# Patient Record
Sex: FEMALE | Race: OTHER | Employment: FULL TIME | URBAN - METROPOLITAN AREA
[De-identification: names, ages, dates, MRNs, and addresses within clinical notes are randomized per-mention and may not be internally consistent; named-entity substitution may affect disease eponyms.]

---

## 2024-05-26 ENCOUNTER — HOSPITAL ENCOUNTER (EMERGENCY)
Facility: HOSPITAL | Age: 19
Discharge: HOME | End: 2024-05-26
Attending: EMERGENCY MEDICINE
Payer: COMMERCIAL

## 2024-05-26 VITALS
RESPIRATION RATE: 16 BRPM | HEART RATE: 89 BPM | OXYGEN SATURATION: 99 % | DIASTOLIC BLOOD PRESSURE: 56 MMHG | SYSTOLIC BLOOD PRESSURE: 91 MMHG

## 2024-05-26 DIAGNOSIS — R46.89 COMBATIVE BEHAVIOR: ICD-10-CM

## 2024-05-26 DIAGNOSIS — F10.929 ALCOHOL INTOXICATION (HCC): Primary | ICD-10-CM

## 2024-05-26 DIAGNOSIS — R45.851 SUICIDAL IDEATIONS: ICD-10-CM

## 2024-05-26 LAB
AMPHETAMINES SERPL QL SCN: NEGATIVE
BARBITURATES UR QL: NEGATIVE
BENZODIAZ UR QL: NEGATIVE
COCAINE UR QL: NEGATIVE
EXT PREGNANCY TEST URINE: NEGATIVE
EXT. CONTROL: NORMAL
FENTANYL UR QL SCN: NEGATIVE
HYDROCODONE UR QL SCN: NEGATIVE
METHADONE UR QL: NEGATIVE
OPIATES UR QL SCN: NEGATIVE
OXYCODONE+OXYMORPHONE UR QL SCN: NEGATIVE
PCP UR QL: NEGATIVE
THC UR QL: POSITIVE

## 2024-05-26 PROCEDURE — 81025 URINE PREGNANCY TEST: CPT

## 2024-05-26 PROCEDURE — 80307 DRUG TEST PRSMV CHEM ANLYZR: CPT

## 2024-05-26 PROCEDURE — 96372 THER/PROPH/DIAG INJ SC/IM: CPT

## 2024-05-26 PROCEDURE — 99285 EMERGENCY DEPT VISIT HI MDM: CPT

## 2024-05-26 PROCEDURE — 99285 EMERGENCY DEPT VISIT HI MDM: CPT | Performed by: EMERGENCY MEDICINE

## 2024-05-26 RX ORDER — WATER 10 ML/10ML
INJECTION INTRAMUSCULAR; INTRAVENOUS; SUBCUTANEOUS
Status: COMPLETED
Start: 2024-05-26 | End: 2024-05-26

## 2024-05-26 RX ORDER — MIDAZOLAM HYDROCHLORIDE 2 MG/2ML
5 INJECTION, SOLUTION INTRAMUSCULAR; INTRAVENOUS ONCE
Status: COMPLETED | OUTPATIENT
Start: 2024-05-26 | End: 2024-05-26

## 2024-05-26 RX ORDER — OLANZAPINE 10 MG/2ML
5 INJECTION, POWDER, FOR SOLUTION INTRAMUSCULAR ONCE
Status: COMPLETED | OUTPATIENT
Start: 2024-05-26 | End: 2024-05-26

## 2024-05-26 RX ADMIN — MIDAZOLAM 5 MG: 1 INJECTION INTRAMUSCULAR; INTRAVENOUS at 05:29

## 2024-05-26 RX ADMIN — WATER 10 ML: 1 INJECTION INTRAMUSCULAR; INTRAVENOUS; SUBCUTANEOUS at 05:30

## 2024-05-26 RX ADMIN — OLANZAPINE 5 MG: 10 INJECTION, POWDER, LYOPHILIZED, FOR SOLUTION INTRAMUSCULAR at 05:30
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
## 2024-05-26 NOTE — ED CARE HANDOFF
Emergency Department Sign Out Note        Sign out and transfer of care from Dr Sofia Jhaveri. See Separate Emergency Department note.     The patient, Cassi Hooks, was evaluated by the previous provider for SI, intoxication.    Workup Completed:  Patient observed until clinically sober.  Point-of-care alcohol breath test showed 0.056.  Patient stated that she no longer felt SI, did not have any plans to hurt herself.  Declined hospital admission via 201 for psychiatric treatment.  No grounds to 302.  Recommended psychiatric follow-up, which patient also declined.                                ED Course as of 05/26/24 1448   Sun May 26, 2024   0702 Patient arrested for fighting, drunk, marijuana use, hx of schizophrenia, expressed SI to police. 21F, zyprexa/versed, re-eval once sober. Not in custody.   0909 Patient denying SI at this time     Procedures  Medical Decision Making  Amount and/or Complexity of Data Reviewed  Labs: ordered.    Risk  Prescription drug management.            Disposition  Final diagnoses:   Alcohol intoxication (HCC)   Suicidal ideations   Combative behavior     Time reflects when diagnosis was documented in both MDM as applicable and the Disposition within this note       Time User Action Codes Description Comment    5/26/2024  9:22 AM Willis Lopez Add [F10.929] Alcohol intoxication (HCC)     5/26/2024  9:22 AM Willis Lopez Add [R45.851] Suicidal ideations     5/26/2024  9:22 AM Willis Lopez Add [R46.89] Combative behavior           ED Disposition       ED Disposition   Discharge    Condition   Stable    Date/Time   Sun May 26, 2024  9:22 AM    Comment   Cassi Hooks discharge to home/self care.                   Follow-up Information       Follow up With Specialties Details Why Contact Info Additional Information    [facility] Psychiatry   [address] [facility] [address] [address] [phone] [facility] [address] [facility]  [place], [place] [id number] [phone]          There are no discharge medications for this patient.    No discharge procedures on file.       ED Provider  Electronically Signed by     Willis Lopez MD  05/26/24 4075

## 2024-05-26 NOTE — ED PROVIDER NOTES
"History  Chief Complaint   Patient presents with    Suicidal     Pt was arrested pta for fighting. She began voicing suicidal ideation to police and was brought here.     20 yo F with apparent hx of schizophrenia presents to the ED in police custody for disorderly conduct. Patient states she got drunk and high. When brought to the police station she made numerous suicidal statements about wanting to hang herself, going home and killing herself. On arrival to the ED, patient is belligerent, aggressive and threatening towards staff. She says she's \"been 302'd numerous times and it doesn't help.\" She says she schizophrenia and not on medication for 2-3 years.         None       History reviewed. No pertinent past medical history.    History reviewed. No pertinent surgical history.    History reviewed. No pertinent family history.  I have reviewed and agree with the history as documented.    E-Cigarette/Vaping     E-Cigarette/Vaping Substances     Social History     Tobacco Use    Smoking status: Every Day     Types: Cigarettes        Review of Systems   Unable to perform ROS: Psychiatric disorder   Skin:  Negative for color change and rash.       Physical Exam  ED Triage Vitals [05/26/24 0532]   Temp Pulse Respirations Blood Pressure SpO2   -- (!) 131 (!) 24 142/62 99 %      Temp src Heart Rate Source Patient Position - Orthostatic VS BP Location FiO2 (%)   -- Monitor Sitting Left arm --      Pain Score       --             Orthostatic Vital Signs  Vitals:    05/26/24 0532 05/26/24 0730   BP: 142/62 91/56   Pulse: (!) 131 89   Patient Position - Orthostatic VS: Sitting Lying       Physical Exam  Vitals and nursing note reviewed.   Constitutional:       General: She is not in acute distress.     Appearance: She is well-developed.   HENT:      Head: Normocephalic and atraumatic.   Eyes:      Conjunctiva/sclera: Conjunctivae normal.   Cardiovascular:      Rate and Rhythm: Normal rate and regular rhythm.      Heart sounds: " No murmur heard.  Pulmonary:      Effort: Pulmonary effort is normal. No respiratory distress.      Breath sounds: Normal breath sounds.   Abdominal:      Palpations: Abdomen is soft.      Tenderness: There is no abdominal tenderness.   Musculoskeletal:         General: No swelling.      Cervical back: Neck supple.   Skin:     General: Skin is warm and dry.      Capillary Refill: Capillary refill takes less than 2 seconds.   Neurological:      Mental Status: She is alert.   Psychiatric:         Speech: Speech is rapid and pressured.         Behavior: Behavior is agitated, aggressive and combative.         Judgment: Judgment is impulsive.         ED Medications  Medications   midazolam (VERSED) injection 5 mg (5 mg Intramuscular Given 5/26/24 0529)   OLANZapine (ZyPREXA) IM injection 5 mg (5 mg Intramuscular Given 5/26/24 0530)   sterile water injection **ADS Override Pull** (10 mL  Given 5/26/24 0530)       Diagnostic Studies  Results Reviewed       Procedure Component Value Units Date/Time    Rapid drug screen, urine [[id number]]  (Abnormal) Collected: 05/26/24 0612    Lab Status: Final result Specimen: Urine, Other Updated: 05/26/24 0650     Amph/Meth UR Negative     Barbiturate Ur Negative     Benzodiazepine Urine Negative     Cocaine Urine Negative     Methadone Urine Negative     Opiate Urine Negative     PCP Ur Negative     THC Urine Positive     Oxycodone Urine Negative     Fentanyl Urine Negative     HYDROCODONE URINE Negative    Narrative:      Presumptive report. If requested, specimen will be sent to reference lab for confirmation.  FOR MEDICAL PURPOSES ONLY.   IF CONFIRMATION NEEDED PLEASE CONTACT THE LAB WITHIN 5 DAYS.    Drug Screen Cutoff Levels:  AMPHETAMINE/METHAMPHETAMINES  1000 ng/mL  BARBITURATES     200 ng/mL  BENZODIAZEPINES     200 ng/mL  COCAINE      300 ng/mL  METHADONE      300 ng/mL  OPIATES      300 ng/mL  PHENCYCLIDINE     25 ng/mL  THC       50 ng/mL  OXYCODONE      100  ng/mL  FENTANYL      5 ng/mL  HYDROCODONE     300 ng/mL    POCT pregnancy, urine [[id number]]  (Normal) Resulted: 05/26/24 0615    Lab Status: Final result Updated: 05/26/24 0615     EXT Preg Test, Ur Negative     Control Valid                   No orders to display         Procedures  Procedures      ED Course                                       Medical Decision Making  20 yo F with apparent hx of schizophrenia presents to the ED in police custody for disorderly conduct.     Dx: SI with plan, alcohol intoxication polysubstance overdose.    Patient presents disorderly, agitated and aggressive.  Patient unable to be verbally de-escalated necessitating chemical restraint with Versed and Zyprexa.  Following this, patient Sting comfortably in bed.  Patient signed out to morning team Dr. Willis Lopez pending sobriety and reevaluation for SI.        Amount and/or Complexity of Data Reviewed  Labs: ordered.    Risk  Prescription drug management.          Disposition  Final diagnoses:   Alcohol intoxication (HCC)   Suicidal ideations   Combative behavior     Time reflects when diagnosis was documented in both MDM as applicable and the Disposition within this note       Time User Action Codes Description Comment    5/26/2024  9:22 AM Willis Lopez Add [F10.929] Alcohol intoxication (HCC)     5/26/2024  9:22 AM Willis Lopez Add [R45.851] Suicidal ideations     5/26/2024  9:22 AM Willis Lopez Add [R46.89] Combative behavior           ED Disposition       ED Disposition   Discharge    Condition   Stable    Date/Time   Sun May 26, 2024  9:22 AM    Comment   Cassi Hooks discharge to home/self care.                   Follow-up Information       Follow up With Specialties Details Why Contact Info Additional Information     [facility]   [address] [place] [address] [phone] [facility] [address] [place] [phone]             There are no discharge medications for this patient.    No discharge procedures on file.    PDMP Review       None             ED Provider  Attending physically available and evaluated Cassi Hooks. I managed the patient along with the ED Attending.    Electronically Signed by           Sofia Jhaveri MD  05/27/24 0227

## 2024-05-26 NOTE — ED NOTES
Pt given belongings to change back into, and phone to call sister for a ride     Joanne Borjas RN  05/26/24 9747

## 2024-05-26 NOTE — DISCHARGE INSTRUCTIONS
You were evaluated in the emergency department for alcohol intoxication, fighting, and statements of self-harm.  You were observed in the emergency department until you became sober.  Upon becoming sober, you denied any suicidal thoughts or inclinations.  You declined a referral to psychiatry.    Please schedule appointment with psychiatry if you change your mind.  Otherwise, follow-up with your primary care provider.  Return to the emergency department if you have more suicidal thoughts, or develop a plan to harm yourself.